# Patient Record
Sex: FEMALE
[De-identification: names, ages, dates, MRNs, and addresses within clinical notes are randomized per-mention and may not be internally consistent; named-entity substitution may affect disease eponyms.]

---

## 2019-07-25 RX ORDER — LEVOTHYROXINE SODIUM 112 MCG
TABLET ORAL
Qty: 30 TABLET | Refills: 0 | Status: SHIPPED | OUTPATIENT
Start: 2019-07-25

## 2019-08-26 RX ORDER — METFORMIN HYDROCHLORIDE 500 MG/1
TABLET, EXTENDED RELEASE ORAL
Qty: 360 TABLET | Refills: 4 | OUTPATIENT
Start: 2019-08-26

## 2022-09-16 PROBLEM — Z00.00 ENCOUNTER FOR PREVENTIVE HEALTH EXAMINATION: Status: ACTIVE | Noted: 2022-09-16

## 2022-10-18 ENCOUNTER — APPOINTMENT (OUTPATIENT)
Dept: NEUROSURGERY | Facility: CLINIC | Age: 52
End: 2022-10-18

## 2022-10-18 DIAGNOSIS — Z86.39 PERSONAL HISTORY OF OTHER ENDOCRINE, NUTRITIONAL AND METABOLIC DISEASE: ICD-10-CM

## 2022-10-18 DIAGNOSIS — H93.A9 PULSATILE TINNITUS, UNSPECIFIED EAR: ICD-10-CM

## 2022-10-18 PROCEDURE — EDU01: CPT

## 2022-10-18 NOTE — REASON FOR VISIT
[Home] : at home, [unfilled] , at the time of the visit. [Medical Office: ([facility])___] : at the medical office located in  [Patient] : the patient [Self] : self [New Patient Visit] : a new patient visit [FreeTextEntry1] : Pulsatile Tinnitus

## 2022-10-18 NOTE — ASSESSMENT
[FreeTextEntry1] : Impression:\par RIGHT Pulsatile Tinnitus\par Improves with ipsilateral neck pressure\par No Headaches or Vision Complaints\par \par We discussed possible causes of pulsatile tinnitus including:\par ENT causes such as semicircular canal dehiscence, tumor, ototoxicity\par Cardiac causes such as aortic stenosis, valve disease, HTN, other causes of heart murmur\par Anemia\par Thyroid disease\par Cerebrovascular causes such as arteriovenous malformation (AVM), dural arteriovenous fistula (dAVF), venous sinus stenosis, venous aneurysm, large trans-mastoid emissary vein, carotid stenosis, jugular bulb diverticulum \par \par MRA 12/2019 reveals enhancement of the right sigmoid sinus technical vs 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  I recommend an MRV to assess for venous sinus stenosis.  It is also very important that she gets a repeat MRA to clarify the abnormal findings from 2019.  \par \par Plan:\par MRA Head w/wo TRICKS Protocol to r/o AVM or dAVF\par MRV Head w/wo to r/o venous sinus stenosis\par Follow Up with Me After Imaging

## 2022-10-18 NOTE — HISTORY OF PRESENT ILLNESS
[de-identified] : I conducted a remote educational consult with IRENE BE on 10/18/2022. I explained that I am only able to provide an educational consult and not render treatment as I am not licensed in the state in which [place] is located. A written informed consent for the educational consult was obtained and is included in the EHR. IRENE BE is informed that there would be a $250 cost associated with the conduct of the remote educational consult. \par \par Ms. BE is a pleasant 52 year old female who presents today with a chief complaint of RIGHT ear pulsatile tinnitus.  It first started in 7/2022, the day after her  had a heart attack.  It is described as a constant, whooshing sound that is in sync with his pulse.  Pressing on the RIGHT side of her neck improves the sound.  \par \par She has been seen by ENT in the past and had a normal hearing test as per patient, records scanned in.  \par \par She denies frequent headaches, blurry vision or diplopia.  She had a normal eye exam this past summer.

## 2022-10-19 ENCOUNTER — TRANSCRIPTION ENCOUNTER (OUTPATIENT)
Age: 52
End: 2022-10-19